# Patient Record
Sex: FEMALE | NOT HISPANIC OR LATINO | URBAN - METROPOLITAN AREA
[De-identification: names, ages, dates, MRNs, and addresses within clinical notes are randomized per-mention and may not be internally consistent; named-entity substitution may affect disease eponyms.]

---

## 2021-03-19 ENCOUNTER — IMPORTED ENCOUNTER (OUTPATIENT)
Dept: URBAN - METROPOLITAN AREA CLINIC 1 | Facility: CLINIC | Age: 61
End: 2021-03-19

## 2021-03-19 PROBLEM — H04.123: Noted: 2021-03-19

## 2021-03-19 PROBLEM — H25.11: Noted: 2021-03-19

## 2021-03-19 PROBLEM — H16.143: Noted: 2021-03-19

## 2021-03-19 PROBLEM — H25.812: Noted: 2021-03-19

## 2021-03-19 PROBLEM — H43.391: Noted: 2021-03-19

## 2021-03-19 PROCEDURE — 92004 COMPRE OPH EXAM NEW PT 1/>: CPT

## 2021-03-19 NOTE — PATIENT DISCUSSION
1.  SCARLETT w/ SPK OU- Pt is symptomatic OS today. Recommend ATs TID-QID OU routinely (sample of Refresh Relieva given coupon given)2. Cataract OU - Observe. 3.  Vitreous Floaters OD - RD precautions. 4.  H/o LASIK OU Patient deferred [facility] Rx today. Return for an appointment in 1 year 27 with Dr. Maximus Capps.

## 2022-04-02 ASSESSMENT — VISUAL ACUITY
OD_SC: 20/20
OS_SC: 20/20
OD_CC: J1
OD_GLARE: 20/30
OS_GLARE: 20/30
OS_CC: J1

## 2022-04-02 ASSESSMENT — TONOMETRY
OS_IOP_MMHG: 12
OD_IOP_MMHG: 12